# Patient Record
Sex: MALE | Race: WHITE | NOT HISPANIC OR LATINO | URBAN - NONMETROPOLITAN AREA
[De-identification: names, ages, dates, MRNs, and addresses within clinical notes are randomized per-mention and may not be internally consistent; named-entity substitution may affect disease eponyms.]

---

## 2019-07-15 ENCOUNTER — IMPORTED ENCOUNTER (OUTPATIENT)
Dept: URBAN - NONMETROPOLITAN AREA CLINIC 1 | Facility: CLINIC | Age: 54
End: 2019-07-15

## 2019-07-15 PROCEDURE — 92310 CONTACT LENS FITTING OU: CPT

## 2019-07-15 PROCEDURE — 92014 COMPRE OPH EXAM EST PT 1/>: CPT

## 2019-07-15 PROCEDURE — 92015 DETERMINE REFRACTIVE STATE: CPT

## 2019-07-15 PROCEDURE — V2521 CNTCT LENS HYDROPHILIC TORIC: HCPCS

## 2019-07-15 NOTE — PATIENT DISCUSSION
Hyperopia/Astigmatism/Presbyopia OUDiscussed refractive status in detail with patient. New glasses and contact Rx given today. Discussed proper care replacement and hygiene. Continue to monitor. Optos done today; normal ocular healthPapilloma LLLDiscussed findings in detail with patient. Recommend refer to Dr. Brenda Anthony for evaluation. Patient to call to schedule. Continue to monitor.

## 2020-09-16 ENCOUNTER — IMPORTED ENCOUNTER (OUTPATIENT)
Dept: URBAN - NONMETROPOLITAN AREA CLINIC 1 | Facility: CLINIC | Age: 55
End: 2020-09-16

## 2020-09-16 PROBLEM — H25.013: Noted: 2020-09-16

## 2020-09-16 PROCEDURE — V2521 CNTCT LENS HYDROPHILIC TORIC: HCPCS

## 2020-09-16 PROCEDURE — 92015 DETERMINE REFRACTIVE STATE: CPT

## 2020-09-16 PROCEDURE — 92310 CONTACT LENS FITTING OU: CPT

## 2020-09-16 PROCEDURE — 92014 COMPRE OPH EXAM EST PT 1/>: CPT

## 2020-09-16 NOTE — PATIENT DISCUSSION
Hyperopia/Astigmatism/Presbyopia OUDiscussed refractive status in detail with patient. New glasses and contact Rx given today. Continue to monitor. Cortical Ctaracts OUDiscussed diagnosis in detail with patientDiscussed signs and symptoms of progressionDiscussed UV protectionNo treatment needed at this time Continue to monitorOptos done today; normal ocular healthPapilloma LLLDiscussed findings in detail with patient. Recommend refer to Dr. Brenda Anthony for evaluation. Patient to call to schedule. Continue to monitor.

## 2021-10-19 ENCOUNTER — IMPORTED ENCOUNTER (OUTPATIENT)
Dept: URBAN - NONMETROPOLITAN AREA CLINIC 1 | Facility: CLINIC | Age: 56
End: 2021-10-19

## 2021-10-19 PROCEDURE — 92310 CONTACT LENS FITTING OU: CPT

## 2021-10-19 PROCEDURE — V2521 CNTCT LENS HYDROPHILIC TORIC: HCPCS

## 2021-10-19 PROCEDURE — 92014 COMPRE OPH EXAM EST PT 1/>: CPT

## 2021-10-19 PROCEDURE — 92015 DETERMINE REFRACTIVE STATE: CPT

## 2021-10-19 NOTE — PATIENT DISCUSSION
Hyperopia/Astigmatism/Presbyopia OUDiscussed refractive status in detail with patient. New glasses and contact Rx given today. Adjusted contacts OU with new CLRx. RTC 1 year or PRN. Continue to monitor. Cortical Cataracts OUDiscussed diagnosis in detail with patientDiscussed signs and symptoms of progressionDiscussed UV protectionNo treatment needed at this time Continue to monitorPapilloma LLLDiscussed findings in detail with patient. Recommend refer to Dr. Rian Sun for evaluation. Patient to call to schedule when desires. Continue to monitor.

## 2022-04-09 ASSESSMENT — VISUAL ACUITY
OU_SC: 20/20
OD_SC: 20/25-
OD_SC: 20/30+
OS_SC: 20/20-
OU_CC: 20/25-2
OS_SC: 20/20-
OD_CC: 20/25-
OS_SC: 20/20
OU_SC: 20/25-2
OD_SC: 20/20

## 2022-04-09 ASSESSMENT — TONOMETRY
OD_IOP_MMHG: 14
OD_IOP_MMHG: 14
OS_IOP_MMHG: 15
OS_IOP_MMHG: 13
OS_IOP_MMHG: 14
OD_IOP_MMHG: 13

## 2022-11-01 ENCOUNTER — COMPREHENSIVE EXAM (OUTPATIENT)
Dept: URBAN - NONMETROPOLITAN AREA CLINIC 1 | Facility: CLINIC | Age: 57
End: 2022-11-01

## 2022-11-01 DIAGNOSIS — H52.03: ICD-10-CM

## 2022-11-01 DIAGNOSIS — H52.223: ICD-10-CM

## 2022-11-01 DIAGNOSIS — H52.4: ICD-10-CM

## 2022-11-01 PROCEDURE — 92015 DETERMINE REFRACTIVE STATE: CPT

## 2022-11-01 PROCEDURE — 92014 COMPRE OPH EXAM EST PT 1/>: CPT

## 2022-11-01 PROCEDURE — 92310-E CONTACT LENS FITTING ESTABLISH PATIENT

## 2022-11-01 ASSESSMENT — TONOMETRY
OD_IOP_MMHG: 14
OS_IOP_MMHG: 14

## 2022-11-01 ASSESSMENT — VISUAL ACUITY
OS_CC: 20/25-1
OD_CC: 20/25-2

## 2023-11-14 ENCOUNTER — COMPREHENSIVE EXAM (OUTPATIENT)
Dept: URBAN - NONMETROPOLITAN AREA CLINIC 1 | Facility: CLINIC | Age: 58
End: 2023-11-14

## 2023-11-14 DIAGNOSIS — H52.4: ICD-10-CM

## 2023-11-14 PROCEDURE — 92310-E CONTACT LENS FITTING ESTABLISH PATIENT

## 2023-11-14 PROCEDURE — 92014 COMPRE OPH EXAM EST PT 1/>: CPT

## 2023-11-14 PROCEDURE — 92015 DETERMINE REFRACTIVE STATE: CPT

## 2023-11-14 ASSESSMENT — VISUAL ACUITY
OD_CC: 20/30
OU_CC: 20/20
OD_CC: 20/30
OS_CC: 20/20

## 2023-11-14 ASSESSMENT — TONOMETRY
OD_IOP_MMHG: 14
OS_IOP_MMHG: 14

## 2024-11-07 ENCOUNTER — COMPREHENSIVE EXAM (OUTPATIENT)
Dept: URBAN - NONMETROPOLITAN AREA CLINIC 1 | Facility: CLINIC | Age: 59
End: 2024-11-07

## 2024-11-07 DIAGNOSIS — H52.4: ICD-10-CM

## 2024-11-07 PROCEDURE — 92015 DETERMINE REFRACTIVE STATE: CPT

## 2024-11-07 PROCEDURE — 92310-1 LEVEL 1 SOFT LENS UPDATE

## 2024-11-07 PROCEDURE — 92014 COMPRE OPH EXAM EST PT 1/>: CPT
